# Patient Record
Sex: FEMALE | Race: WHITE
[De-identification: names, ages, dates, MRNs, and addresses within clinical notes are randomized per-mention and may not be internally consistent; named-entity substitution may affect disease eponyms.]

---

## 2018-10-16 ENCOUNTER — HOSPITAL ENCOUNTER (OUTPATIENT)
Dept: HOSPITAL 62 - WI | Age: 38
End: 2018-10-16
Attending: SURGERY
Payer: OTHER GOVERNMENT

## 2018-10-16 DIAGNOSIS — R10.11: Primary | ICD-10-CM

## 2018-10-16 DIAGNOSIS — K76.0: ICD-10-CM

## 2018-10-16 DIAGNOSIS — K80.80: ICD-10-CM

## 2018-10-16 PROCEDURE — 76705 ECHO EXAM OF ABDOMEN: CPT

## 2018-10-16 NOTE — RADIOLOGY REPORT (SQ)
EXAM DESCRIPTION:  U/S ABDOMEN LIMITED W/O DOP



COMPLETED DATE/TIME:  10/16/2018 9:18 am



REASON FOR STUDY:  RIGHT UPPER QUADRANT PAIN R10.11  RIGHT UPPER QUADRANT PAIN



COMPARISON:  1/20/2016



TECHNIQUE:  Dynamic and static grayscale images acquired of the abdomen and recorded on PACS. Additio
nal selected color Doppler and spectral images recorded.



LIMITATIONS:  None.



FINDINGS:  PANCREAS: No masses.  Visualized pancreatic duct normal caliber.

LIVER: No masses.  The liver is echogenic consistent with fatty infiltration.

LIVER VASCULATURE: Normal directional flow of the main portal vein and hepatic veins.

GALLBLADDER: There are gallstones.  No wall thickening.  No pericholecystic edema.

ULTRASOUND-DETECTED FRANKLIN'S SIGN: Negative.

INTRAHEPATIC DUCTS AND COMMON DUCT: CBD and intrahepatic ducts normal caliber. No filling defects.

AORTA: No aneurysm.

RIGHT KIDNEY:  Normal size. Normal echogenicity. No solid or suspicious masses. No hydronephrosis. No
 calcifications.

PERITONEAL AND RIGHT PLEURAL SPACE: No ascites or effusions.

OTHER: No other significant findings.



IMPRESSION:  Gallstones.  Fatty infiltrated liver.



TECHNICAL DOCUMENTATION:  JOB ID:  [id number]

 2011 [employer]- All Rights Reserved



Reading location - IP/workstation name: [email or web address]